# Patient Record
Sex: MALE | Race: BLACK OR AFRICAN AMERICAN | NOT HISPANIC OR LATINO
[De-identification: names, ages, dates, MRNs, and addresses within clinical notes are randomized per-mention and may not be internally consistent; named-entity substitution may affect disease eponyms.]

---

## 2023-08-22 PROBLEM — Z00.00 ENCOUNTER FOR PREVENTIVE HEALTH EXAMINATION: Status: ACTIVE | Noted: 2023-08-22

## 2023-08-28 ENCOUNTER — NON-APPOINTMENT (OUTPATIENT)
Age: 38
End: 2023-08-28

## 2023-08-28 ENCOUNTER — APPOINTMENT (OUTPATIENT)
Dept: ORTHOPEDIC SURGERY | Facility: CLINIC | Age: 38
End: 2023-08-28
Payer: COMMERCIAL

## 2023-08-28 VITALS
WEIGHT: 190 LBS | HEART RATE: 80 BPM | DIASTOLIC BLOOD PRESSURE: 78 MMHG | SYSTOLIC BLOOD PRESSURE: 134 MMHG | HEIGHT: 67 IN | BODY MASS INDEX: 29.82 KG/M2

## 2023-08-28 DIAGNOSIS — M54.50 LOW BACK PAIN, UNSPECIFIED: ICD-10-CM

## 2023-08-28 DIAGNOSIS — M41.25 OTHER IDIOPATHIC SCOLIOSIS, THORACOLUMBAR REGION: ICD-10-CM

## 2023-08-28 PROCEDURE — 72110 X-RAY EXAM L-2 SPINE 4/>VWS: CPT

## 2023-08-28 PROCEDURE — 99204 OFFICE O/P NEW MOD 45 MIN: CPT

## 2023-08-29 NOTE — HISTORY OF PRESENT ILLNESS
[___ wks] : [unfilled] week(s) ago [4] : a current pain level of 4/10 [Constant] : ~He/She~ states the symptoms seem to be constant [Bending] : worsened by bending [Lifting] : worsened by lifting [de-identified] : A 37 year old male presents an initial visit for back pain. The patient has been experiencing significant back pain ongoing for over one week with no prior injury. The patient denies any radicular symptoms to his legs at this time. He denies any groin pain at this time. The patient denies any symptoms of numbness, tingling, or weakness. The patient currently takes Tylenol to aid his current symptoms. The patient denies any prior treatment. Patient is a .

## 2023-08-29 NOTE — PHYSICAL EXAM
[LE] : Sensory: Intact in bilateral lower extremities [Normal RLE] : Right Lower Extremity: No scars, rashes, lesions, ulcers, skin intact [Normal LLE] : Left Lower Extremity: No scars, rashes, lesions, ulcers, skin intact [Normal] : Oriented to person, place, and time, insight and judgement were intact and the affect was normal [de-identified] : Full Range of Motion to Flexion & Lateral Tilting. Right Thoracic & Left Lumbar Flank Deformity.  Asymmetry noted on Beto's Forward bending test. [de-identified] : AP & Lateral Flexion Extension X-Rays of the Lumbar Spine performed on 08/28/2023 shows Thoracolumbar scoliosis measuring to about 45 degrees.

## 2023-08-29 NOTE — DISCUSSION/SUMMARY
[PRN] : PRN [de-identified] : I discussed the underlying pathophysiology of the patient's condition & X-Ray findings. I expressed to the patient that his symptoms may be consistent with scoliosis in his back measuring to about 45 degrees. The patient and I discussed his options regarding treatment. I highly recommended that the patient starts a course of physical therapy at this time. I provided the patient with a detailed prescription for physical therapy. I also provided the patient with a recommended Orthopedic specialist to consult on his scoliosis. Activity modifications were reviewed with the patient at length. He should begin to exercise and focus on core strengthening to aid his current symptoms and consult with the Orthopedic specialist for further treatment. He should return to me as needed.

## 2023-08-29 NOTE — CONSULT LETTER
[Dear  ___] : Dear  [unfilled], [Consult Letter:] : I had the pleasure of evaluating your patient, [unfilled]. [Please see my note below.] : Please see my note below. [Sincerely,] : Sincerely, [FreeTextEntry3] : Delvis Wharton MD

## 2023-08-29 NOTE — ADDENDUM
[FreeTextEntry1] : I, Juan Francisco Gerardo Jr, acted solely as a scribe for Dr. Delvis Wharton on this date 08/28/2023 .  All medical record entries made by the Scribe were at my, Dr. Delvis Wharton, direction and personally dictated by me on 08/28/2023 . I have reviewed the chart and agree that the record accurately reflects my personal performance of the history, physical exam, assessment and plan. I have also personally directed, reviewed, and agreed with the chart.

## 2024-09-24 ENCOUNTER — NON-APPOINTMENT (OUTPATIENT)
Age: 39
End: 2024-09-24

## 2024-11-18 ENCOUNTER — NON-APPOINTMENT (OUTPATIENT)
Age: 39
End: 2024-11-18